# Patient Record
Sex: FEMALE | URBAN - NONMETROPOLITAN AREA
[De-identification: names, ages, dates, MRNs, and addresses within clinical notes are randomized per-mention and may not be internally consistent; named-entity substitution may affect disease eponyms.]

---

## 2023-01-26 ENCOUNTER — APPOINTMENT (RX ONLY)
Dept: URBAN - NONMETROPOLITAN AREA CLINIC 13 | Facility: CLINIC | Age: 73
Setting detail: DERMATOLOGY
End: 2023-01-26

## 2023-01-26 DIAGNOSIS — L64.8 OTHER ANDROGENIC ALOPECIA: ICD-10-CM

## 2023-01-26 DIAGNOSIS — L21.8 OTHER SEBORRHEIC DERMATITIS: ICD-10-CM

## 2023-01-26 DIAGNOSIS — L98.8 OTHER SPECIFIED DISORDERS OF THE SKIN AND SUBCUTANEOUS TISSUE: ICD-10-CM

## 2023-01-26 PROCEDURE — ? ADDITIONAL NOTES

## 2023-01-26 PROCEDURE — ? PRESCRIPTION

## 2023-01-26 PROCEDURE — 99204 OFFICE O/P NEW MOD 45 MIN: CPT

## 2023-01-26 PROCEDURE — ? TREATMENT REGIMEN

## 2023-01-26 PROCEDURE — ? COUNSELING

## 2023-01-26 RX ORDER — KETOCONAZOLE 20 MG/ML
SHAMPOO, SUSPENSION TOPICAL QD
Qty: 120 | Refills: 3 | Status: ERX | COMMUNITY
Start: 2023-01-26

## 2023-01-26 RX ADMIN — KETOCONAZOLE AAA: 20 SHAMPOO, SUSPENSION TOPICAL at 00:00

## 2023-01-26 ASSESSMENT — LOCATION SIMPLE DESCRIPTION DERM
LOCATION SIMPLE: RIGHT EYEBROW
LOCATION SIMPLE: RIGHT CHEEK
LOCATION SIMPLE: LEFT SCALP
LOCATION SIMPLE: LEFT CHEEK
LOCATION SIMPLE: LEFT EYEBROW

## 2023-01-26 ASSESSMENT — LOCATION DETAILED DESCRIPTION DERM
LOCATION DETAILED: LEFT CENTRAL FRONTAL SCALP
LOCATION DETAILED: LEFT CENTRAL MALAR CHEEK
LOCATION DETAILED: LEFT CENTRAL EYEBROW
LOCATION DETAILED: RIGHT CENTRAL EYEBROW
LOCATION DETAILED: RIGHT CENTRAL MALAR CHEEK

## 2023-01-26 ASSESSMENT — LOCATION ZONE DERM
LOCATION ZONE: SCALP
LOCATION ZONE: FACE

## 2023-01-26 NOTE — PROCEDURE: TREATMENT REGIMEN
Detail Level: Zone
Initiate Treatment: ketoconazole 2 % shampoo qd\\nQuantity: 120.0 g  Days Supply: 30\\nSig: Lather to affected areas let sit for 5 minutes then rinse QD x 1 week then repeat treatment once every 10 days for maintenance

## 2023-01-26 NOTE — PROCEDURE: ADDITIONAL NOTES
Additional Notes: -recommended Nutralfol and Collagen
Detail Level: Simple
Render Risk Assessment In Note?: no
Additional Notes: -recommended pt set up consult at [facility]

## 2023-02-16 ENCOUNTER — APPOINTMENT (RX ONLY)
Dept: URBAN - NONMETROPOLITAN AREA CLINIC 13 | Facility: CLINIC | Age: 73
Setting detail: DERMATOLOGY
End: 2023-02-16

## 2023-02-16 DIAGNOSIS — Z41.9 ENCOUNTER FOR PROCEDURE FOR PURPOSES OTHER THAN REMEDYING HEALTH STATE, UNSPECIFIED: ICD-10-CM

## 2023-02-16 PROCEDURE — ? COSMETIC CONSULTATION: MICRONEEDLING

## 2023-02-16 PROCEDURE — ? COSMETIC CONSULTATION: FRACTIONAL RESURFACING

## 2023-02-16 PROCEDURE — ? COSMETIC CONSULTATION - KYBELLA

## 2023-02-16 PROCEDURE — ? COSMETIC CONSULTATION: BOTOX

## 2023-02-16 PROCEDURE — ? COSMETIC CONSULTATION: FILLERS

## 2023-03-03 ENCOUNTER — APPOINTMENT (RX ONLY)
Dept: URBAN - NONMETROPOLITAN AREA CLINIC 13 | Facility: CLINIC | Age: 73
Setting detail: DERMATOLOGY
End: 2023-03-03

## 2023-03-03 DIAGNOSIS — Z41.9 ENCOUNTER FOR PROCEDURE FOR PURPOSES OTHER THAN REMEDYING HEALTH STATE, UNSPECIFIED: ICD-10-CM

## 2023-03-03 PROCEDURE — ? BELOTERO INJECTION

## 2023-03-03 PROCEDURE — ? BOTOX

## 2023-03-03 PROCEDURE — ? COSMETIC CONSULTATION: BOTOX

## 2023-03-03 PROCEDURE — ? JUVEDERM ULTRA XC INJECTION

## 2023-03-03 ASSESSMENT — LOCATION SIMPLE DESCRIPTION DERM
LOCATION SIMPLE: LEFT TEMPLE
LOCATION SIMPLE: RIGHT LIP
LOCATION SIMPLE: RIGHT CHEEK
LOCATION SIMPLE: LEFT LIP
LOCATION SIMPLE: RIGHT TEMPLE
LOCATION SIMPLE: LEFT CHEEK

## 2023-03-03 ASSESSMENT — LOCATION DETAILED DESCRIPTION DERM
LOCATION DETAILED: RIGHT INFERIOR TEMPLE
LOCATION DETAILED: LEFT SUPERIOR CENTRAL MALAR CHEEK
LOCATION DETAILED: LEFT MEDIAL BUCCAL CHEEK
LOCATION DETAILED: LEFT INFERIOR TEMPLE
LOCATION DETAILED: RIGHT SUPERIOR LATERAL MALAR CHEEK
LOCATION DETAILED: RIGHT INFERIOR VERMILION LIP
LOCATION DETAILED: LEFT SUPERIOR VERMILION LIP
LOCATION DETAILED: RIGHT MEDIAL BUCCAL CHEEK
LOCATION DETAILED: LEFT INFERIOR VERMILION LIP
LOCATION DETAILED: LEFT SUPERIOR LATERAL MALAR CHEEK
LOCATION DETAILED: RIGHT SUPERIOR VERMILION LIP

## 2023-03-03 ASSESSMENT — LOCATION ZONE DERM
LOCATION ZONE: FACE
LOCATION ZONE: LIP

## 2023-03-03 NOTE — PROCEDURE: BOTOX
Mentalis Units: 0
Additional Area 6 Location: Chin
Show Ucl Units: No
Additional Area 2 Location: Upper [place]-Oral Lines
Show Additional Area 5: Yes
Incrementing Botox Units: By 0.5 Units
Post-Care Instructions: Instructed to not lie down for 4 hours, do not massage areas injected, avoid headbands/hats if forehead was treated, and limit physical activity and extreme heat for 24 hours.
Comments: Patient encouraged to come back in 2 weeks for touch up if needed.\\n\\n Patient agrees with plan and verbalizes understanding.\\n\\n\\n
Additional Area 1 Location: Lateral brow lift
Additional Area 5 Location: Infraorbital eyelids
Detail Level: Detailed
Patient Specific Comments (Will Not Stick From Patient To Patient): Professional sample used for [employer], Buy one syringe Juvederm Ultra and receive 20 units botox for free.
Additional Area 4 Location: Magdalene-oral lip lines
Expiration Date (Month Year): 04/2025
Inferior Lateral Orbicularis Oculi Units: 20
Lot #: [id number]
Additional Area 3 Location: Brow Lift
Dilution (U/0.1 Cc): 4
Consent obtained through [facility] charting. Risks include but not limited to lid/brow ptosis, bruising, swelling, diplopia, temporary effect, incomplete chemical denervation.\\n\\n\\nPhotos taken prior to injection.\\n

## 2023-03-03 NOTE — PROCEDURE: JUVEDERM ULTRA XC INJECTION
Anesthesia Type: 1% lidocaine with epinephrine
Additional Area 2 Volume In Cc: 0
Include Cannula Information In Note?: No
Post-Care Instructions: Patient instructed to apply ice to reduce swelling and arnica gel to minimize bruising.\\n\\nPatient advised to come back in 2 weeks if any concerns develop.
Filler: Juvederm Ultra XC
Detail Level: Detailed
Additional Anesthesia Volume In Cc: 6
Expiration Date (Month Year): 08/07/2023
Map Statment: See Attach Map for Complete Details
Topical Anesthesia?: 23% lidocaine, 7% tetracaine
Price (Use Numbers Only, No Special Characters Or $): 870
Number Of Syringes (Required For Inventory): 1
Vermilion Lips Filler Volume In Cc: 0.3
Lot #: [id number]
Consent: Electronic consent obtained through [facility]. Risks include but not limited to bruising, beading, irregular texture, ulceration, infection, allergic reaction, scar formation, incomplete augmentation, temporary nature, procedural pain.\\n\\n
Procedural Text: The filler was administered to the treatment areas noted above.\\n\\nPhotos taken prior to injections.\\n
Marionette Lines Filler Volume In Cc: 0.7

## 2023-03-03 NOTE — PROCEDURE: BELOTERO INJECTION
Additional Area 4 Volume In Cc: 0
Procedural Text: The filler was administered to the treatment areas noted above.\\n\\nPhotos taken prior to injections.
Map Statement: See Attach Map for Complete Details
Price (Use Numbers Only, No Special Characters Or $): 473
Number Of Syringes (Required For Inventory): 1
Detail Level: Detailed
Use Map Statement For Sites (Optional): No
Filler: Belotero
Consent: Electronic consent obtained in [facility]. Risks include but not limited to bruising, beading, irregular texture, ulceration, infection, allergic reaction, scar formation, incomplete augmentation, temporary nature, procedural pain.
Expiration Date (Month Year): 11-
Post-Care Instructions: Patient instructed to apply ice to reduce swelling.
Additional Anesthesia Volume In Cc: 6
Additional Area 1 Location: Magdal[place]-Oral Lines
Topical Anesthesia?: 23% lidocaine, 7% tetracaine
Lot #: [id number]

## 2024-04-25 ENCOUNTER — APPOINTMENT (RX ONLY)
Dept: URBAN - NONMETROPOLITAN AREA CLINIC 13 | Facility: CLINIC | Age: 74
Setting detail: DERMATOLOGY
End: 2024-04-25

## 2024-04-25 DIAGNOSIS — Z41.9 ENCOUNTER FOR PROCEDURE FOR PURPOSES OTHER THAN REMEDYING HEALTH STATE, UNSPECIFIED: ICD-10-CM

## 2024-04-25 PROCEDURE — ? JUVEDERM ULTRA XC INJECTION

## 2024-04-25 PROCEDURE — ? BELOTERO INJECTION

## 2024-04-25 PROCEDURE — ? BOTOX

## 2024-04-25 PROCEDURE — ? RESTYLANE LYFT INJECTION

## 2024-04-25 NOTE — PROCEDURE: RESTYLANE LYFT INJECTION
Cheeks Filler Volume In Cc: 1
Anesthesia Volume In Cc: 0
Lot #: [id number]
Additional Anesthesia Type: 1% lidocaine with epinephrine
Consent: Electronic consent obtained through [facility]. Risks include but not limited to bruising, beading, irregular texture, ulceration, infection, allergic reaction, scar formation, incomplete augmentation, temporary nature, procedural pain.
Filler: Sil Crooks
Procedural Text: The filler was administered to the treatment areas noted above.\\n\\nPhotos taken prior to injections.
Map Statement: See Attach Map for Complete Details
Price (Use Numbers Only, No Special Characters Or $): 726
Include Cannula Information In Note?: No
Include Cannula Size?: 25G
Post-Care Instructions: Patient instructed to apply ice to reduce swelling.
Expiration Date (Month Year): 02-
Detail Level: Detailed
Topical Anesthesia?: 23% lidocaine, 7% tetracaine
Include Cannula Length?: 1.5 inch

## 2024-04-25 NOTE — PROCEDURE: BOTOX
Left Pupillary Line Units: 0
Incrementing Botox Units: By 0.5 Units
Show Right And Left Periorbital Units: No
Show Additional Area 3: Yes
Additional Area 4 Location: Chin
Expiration Date (Month Year): 05/2026
Detail Level: Detailed
Lot #: [id number]
Price (Use Numbers Only, No Special Characters Or $): 567
Additional Area 3 Location: lower fan crows feet
Inferior Lateral Orbicularis Oculi Units: 20
Dilution (U/0.1 Cc): 4
Consent obtained through [facility] charting. Risks include but not limited to lid/brow ptosis, bruising, swelling, diplopia, temporary effect, incomplete chemical denervation.\\n\\nBotox administered per written protocol per Daisy German MD.\\n\\nPhotos taken prior to injections.
Additional Area 6 Location: Theresa lift
Additional Area 2 Location: Upper [place]-Oral Lines
Post-Care Instructions: Instructed to not lie down for 4 hours, do not massage areas injected, avoid headbands/hats if forehead was treated, and limit physical activity and extreme heat for 24 hours.
Comments: Patient encouraged to come back in 2 weeks for touch up if needed.\\n\\n Patient agrees with plan and verbalizes understanding.
Additional Area 5 Location: Infraorbital eyelids
Additional Area 1 Location: Lateral brow lift

## 2024-04-25 NOTE — PROCEDURE: BELOTERO INJECTION
Marionette Lines Filler Volume In Cc: 0
Anesthesia Volume In Cc: 0.5
Number Of Syringes (Required For Inventory): 1
Lot #: [id number]
Additional Area 1 Volume In Cc: 0.8
Topical Anesthesia?: BLT (benzocaine 23%, lidocaine 10%, tetracaine 10%)
Include Cannula Size?: 25G
Price (Use Numbers Only, No Special Characters Or $): 479
Additional Area 2 Location: Lower teet[place]-oral rhytids
Use Map Statement For Sites (Optional): No
Procedural Text: The filler was administered to the treatment areas noted above.\\n\\nPhotos taken prior to injections and after injections.
Additional Area 2 Volume In Cc: 0.2
Consent: Electronic consent obtained in [facility]. Risks include but not limited to bruising, beading, irregular texture, ulceration, infection, allergic reaction, scar formation, incomplete augmentation, temporary nature, procedural pain.
Filler: Belotero
Expiration Date (Month Year): 04-
Additional Anesthesia Volume In Cc: 6
Additional Area 1 Location: Upper [place]-Oral Lines
Map Statement: See Attach Map for Complete Details
Detail Level: Detailed
Post-Care Instructions: Patient instructed to apply ice to reduce swelling. Ice pack provided.
Include Cannula Length?: 1.5 inch